# Patient Record
Sex: MALE | Race: BLACK OR AFRICAN AMERICAN | NOT HISPANIC OR LATINO | URBAN - METROPOLITAN AREA
[De-identification: names, ages, dates, MRNs, and addresses within clinical notes are randomized per-mention and may not be internally consistent; named-entity substitution may affect disease eponyms.]

---

## 2023-03-29 ENCOUNTER — EMERGENCY (EMERGENCY)
Facility: HOSPITAL | Age: 68
LOS: 1 days | Discharge: HOME | End: 2023-03-29
Admitting: EMERGENCY MEDICINE
Payer: MEDICARE

## 2023-03-29 VITALS
DIASTOLIC BLOOD PRESSURE: 90 MMHG | TEMPERATURE: 98 F | HEIGHT: 72 IN | OXYGEN SATURATION: 99 % | SYSTOLIC BLOOD PRESSURE: 154 MMHG | HEART RATE: 60 BPM | RESPIRATION RATE: 16 BRPM | WEIGHT: 171.96 LBS

## 2023-03-29 PROCEDURE — 73140 X-RAY EXAM OF FINGER(S): CPT

## 2023-03-29 PROCEDURE — 73140 X-RAY EXAM OF FINGER(S): CPT | Mod: 26,RT

## 2023-03-29 PROCEDURE — 29130 APPL FINGER SPLINT STATIC: CPT | Mod: F5

## 2023-03-29 PROCEDURE — 99284 EMERGENCY DEPT VISIT MOD MDM: CPT | Mod: 25

## 2023-03-29 PROCEDURE — 99283 EMERGENCY DEPT VISIT LOW MDM: CPT

## 2023-03-29 NOTE — ED ADULT NURSE NOTE - CHIEF COMPLAINT
Patients insurance: [employer]  Patient ID#: [id number]    Patient has [employer]. Coverage is based on medical necessity.  [address], [id number] DOES NOT require prior authorization.   Approval expires on 12/31/2022.    Medical insurance, buy and bill.  [id number] entered for scheduling.    Insurance discloser: Prior authorization is not a guarantee of payment. Patient or provider should contact Insurance Customer Service - Benefit Line with specific code information to determine if an item or service will be a covered benefit.     The patient is a 67y Male complaining of finger pain/injury.